# Patient Record
(demographics unavailable — no encounter records)

---

## 2024-10-27 NOTE — HISTORY OF PRESENT ILLNESS
[de-identified] : bad cough x Monday. fever x 2 days ago. afebrile today per dad wants lungs checked [FreeTextEntry6] : No meds given today No vomiting, coughing is worse at night No known illness exposures Is hydrating well

## 2024-10-27 NOTE — HISTORY OF PRESENT ILLNESS
[de-identified] : bad cough x Monday. fever x 2 days ago. afebrile today per dad wants lungs checked [FreeTextEntry6] : No meds given today No vomiting, coughing is worse at night No known illness exposures Is hydrating well

## 2024-10-27 NOTE — PHYSICAL EXAM
[Clear Rhinorrhea] : clear rhinorrhea [NL] : clear to auscultation bilaterally [Normal S1, S2 audible] : normal S1, S2 audible [Clear] : clear [FreeTextEntry4] : Stuffy

## 2024-10-27 NOTE — DISCUSSION/SUMMARY
[FreeTextEntry1] : Cough likely due to postnasal drip and nasal congestion Prop head up to sleep saline to nose can also try decongestant Or allergy medication considering allergy still prominent due to weather If fevers return or cough should worsen to recheck over the next 3 to 4 days